# Patient Record
Sex: FEMALE | Race: WHITE | Employment: FULL TIME | URBAN - NONMETROPOLITAN AREA
[De-identification: names, ages, dates, MRNs, and addresses within clinical notes are randomized per-mention and may not be internally consistent; named-entity substitution may affect disease eponyms.]

---

## 2021-06-02 ENCOUNTER — HOSPITAL ENCOUNTER (OUTPATIENT)
Dept: PHYSICAL THERAPY | Age: 56
Setting detail: THERAPIES SERIES
Discharge: HOME | End: 2021-06-02
Payer: COMMERCIAL

## 2021-06-02 PROCEDURE — 97162 PT EVAL MOD COMPLEX 30 MIN: CPT

## 2021-06-02 NOTE — PROGRESS NOTES
swiss ball core, body mechanics. History of Present Illness: Back flare up September 2020. Radiculopathy RLE with constant numbness right middle 3 toes and lateral right leg. Weakness left ankle, extensor hallucis, hip and core musculature. Previous PT at [facility] in [facility]. SUBJECTIVE: States started doing yardwork and lifting heavy stones. Also carrying heavy sandbag around the yard. Pain was not right away. But, 1-2 weeks later twisted back tying shoe and hurt her back. This injury occurred early to mid September 2020. Noted pain at right lateral lumbar region and buttock. Was having increased pain and so had to stand and use standing desk at work. Pain became intense and noted with all activities such as standing, sitting and even laying down. She was also having difficulty driving. Patient went to [facility] PT in [facility] and did some exercises but pain stayed on and continued with HEP. Mid March 2021 was continuing to have numbness and weakness at RLE. MRI completed in April 2021. and then went for consult with Dr. Kymberly Greenberg. Now at present, back is getting better. Noting numbness at outer calf region and middle toes of her right foot. Notes intermittent right buttock pain in morning. Also, sometimes feels mild right low back pain. Noting morning stiffness, prolonged sitting. Standing and walking doing fine. Notes numbness more with long walks. Does not take medication for her back. Patient goals for PT: Anything she can do to take away numbness in her right leg. Learn activities and things to protect her back from further injury. Stay active. Patient enjoys yardwork, walk daily 10, 000 steps per day. Social/Functional History and Current Status:  Medications and Allergies have been reviewed and are listed on Medical History Questionnaire. Tamara Moreno lives with spouse in a multiple floor home with 3 steps with railing.     Task Previous Current   ADLs  Independent Independent   IADL's Independent Independent   Ambulation Independent Independent   Transfers Independent Independent   Recreation Independent-gardening, walking,  Modified Independent modified gardening techniques, resumed walking program but having increased numbness RLE lower leg and toes. Community Integration Independent Independent   Driving Active  Active    Work [employer]. 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 Prolonged sitting, Using stand up desk when needed. Sitting for 1 hour at a time. Bulk of day sitting. Full-Time. resumed work activities without difficulty. Does get up every hour to change positions. Notes no increased numbness and tingling at RLE in sitting. OBJECTIVE:    Pain: 0/10 but constant numbness at right lower leg laterally and middle 3 toes. Aggravated with walking more. Occasional pain at low back on right. Palpation Mild tenderness at right lumbosacral and right mid gluteal region at piriformis region,    Observation Noted good general mobility with bed mobility, transfers, partial squat. Limited strength right ankle, hip and core musculature. Decreased curve reversal bilateral lumbar with forward flexion. Posture Note fairly good head and neck and thoracic alignment, Note tendency to stand with slight sway back posturing with decreased core engagement with standing position,        Range of Motion Note decreased reversal of lumbar spine region with forward flexion with fingertips from floor 8 inches, Note extension lumbar spine WNLS. Noted pulling at right buttock and hamstring region with forward flexion and irritation in this region with extension. LE ROM: right hip flexion 115 degrees, left 140 degrees, SLR right 50 degrees, left 75 degrees. Strength Decreased strength at right extensor hallucis of great toe.  Decreased strength at right ankle with single heel raises with ankle inversion noted weakness of evertors and plantarflexors. Patient able to complete full squat but decreased wt shift through RLE. MMT WNLS hip and knee with exception right extensor hallucis great toe and ankle. See above functional strength testing. Decreased core engagment noted with standing position, decreased awareness of optimal lumbar neutral postion. Sensation Numbness lateral foot and middle 3 toes. Bed Mobility Independent with bed mobility. Transfers Transfers independent with sit<>stand. Ambulation Ambulation with normal gait pattern. Balance Not assessed. Special Tests Negative SLR test and seated RLE knee extension. TREATMENT   Precautions:    Pain:     X in shaded column indicates activity completed today   Modalities Parameters/  Location  Notes                     Manual Therapy Time/Technique  Notes                     Exercise/Intervention   Notes                                                                                  Specific Interventions Next Treatment: SKTC, Diagonal KTC, right piriformis stretch, hamstring stretch,  pelvic tilt, dead bugs/marches, UE reciprocal/LE reciprocal, bridges. Core engagement/strengthening. Progress to seated/standing dynamic stabilization. Activity/Treatment Tolerance:  [x]  Patient tolerated treatment well  []  Patient limited by fatigue  []  Patient limited by pain   []  Patient limited by medical complications  []  Other:     Assessment: Patient referred to PT low back pain with radiculopathy decreased sensation right foot at 3 middle toes and left lateral lower leg. Noting decreased strength through RLE, decreased core engagement. Decreased balance through RLE. Patient has not resumed full activity level with outdoor gardening, walking program. Limited sitting tolerance > 1 hour with position changes. Decreased lumbar articulation in flexion. Will follow 2-3x per week to address deficits.     Body Structures/Functions/Activity Limitations: impaired activity tolerance, impaired balance, impaired ROM, impaired safety awareness, impaired sensation, impaired strength and pain  Prognosis: good    GOALS:  Patient Billy Mckeon she can do to take away numbness in her right leg. Learn activities and things to protect her back from further injury. Stay active. Patient enjoys yardwork, walk daily 10, 000 steps per day. :     Short Term Goals:  Time Frame: 4 weeks  1. Patient to report of pain levels 0-1/10 at right buttock,  with PT and activities of daily living, work. 2. Patient to demonstrate increased sitting tolerance > 1hour to unlimited sitting tolerance, standing and walking tolerance for 1 hour. 3. Patient to demonstrate increased lumbar articulation in flexion and LE flexibility with forward flexion fingertips from floor 8 inches to 2 inches. 4. LE flexibility increased from right SKTC 115 degrees to 130 degrees, SLR 50 degrees to 70 degrees with increased ease with flexibility /mobility. 5. Patient to demonstrate increased core engagement and strength through abdominal and trunk muscles with ability to have awareness of optimal lumbar neutral 15 reps with a series of exercises. 6. Patient to demonstrate increased strength right LE strength right ankle eversion, extensor hallucis and plantarflexors 4-/5 to 4/5. Ability to complete single heelraise RLE with ability to complete 5 resp with good ankle stability without turning into inversion/supination. Long Term Goals:  Time Frame: 8 weeks  1. Patient has knowledge of proper body mechanics to decrease stress at low back. 2. Oswestry from 2% to 0%  3. Patient independent in HEP with follow through with exercises for core strengthening, flexibility, RLE strengthening and body mechanics. Patient Education: Use of lumbar roll when sitting, driving. Body mechanics with limit of forward flexion of trunk, squatting/bending at knees to retrieve object from floor.     [x]  HEP/Education Completed: Plan of Care, Goals,    Medbridge Access Code:  []  No new Education completed  []  Reviewed Prior HEP      []  Patient verbalized and/or demonstrated understanding of education provided. []  Patient unable to verbalize and/or demonstrate understanding of education provided. Will continue education. [x]  Barriers to learning: none    PLAN:  Treatment Recommendations: Strengthening, Range of Motion, Balance Training, Functional Mobility Training, Transfer Training, Neuromuscular Re-education, Manual Therapy - Soft Tissue Mobilization, Pain Management, Home Exercise Program, Patient Education and Self-Care Education and Training    [x]  Plan of care initiated. Plan to see patient 2-3 times per week for 8 weeks to address the treatment planned outlined above.   []  Continue with current plan of care  []  Modify plan of care as follows:    []  Hold pending physician visit  []  Discharge    Time In 0710   Time Out 0750   Timed Code Minutes: 0 min   Total Treatment Time: 40 min       Electronically Signed by: Adriana Edge PT

## 2021-06-04 ENCOUNTER — HOSPITAL ENCOUNTER (OUTPATIENT)
Dept: PHYSICAL THERAPY | Age: 56
Setting detail: THERAPIES SERIES
Discharge: HOME | End: 2021-06-04
Payer: COMMERCIAL

## 2021-06-04 PROCEDURE — 97110 THERAPEUTIC EXERCISES: CPT

## 2021-06-04 NOTE — PROGRESS NOTES
[address] [facility]  PHYSICAL THERAPY  [] EVALUATION  [x] DAILY NOTE (LAND) [] DAILY NOTE (AQUATIC ) [] PROGRESS NOTE [] DISCHARGE NOTE    [x] OUTPATIENT REHABILITATION CENTER [facility]   [] [place] [address]    [] [facility]   [] Rosie Hutchisony    Date: 2021  Patient Name:  Caroline Sebastian  : 1965  MRN: [id number]  CSN: [id number]    Referring Practitioner Wesley Boyer DO   Diagnosis Radiculopathy, lumbar region [M54.16]  Other intervertebral disc displacement, lumbar region [M51.26]    Treatment Diagnosis Intermittent back and right buttock pain with RLE weakness/numbness. Date of Evaluation 21    Additional Pertinent History History of low back pain 2020      Functional Outcome Measure Used Oswestry    2%. Insurance: Primary: Payor: Minda Fraser /  /  / ,   Secondary:    Authorization Information: No precertification, Max of 30 visits per calendar year. PT alone. Aquatic therapy yes, modalities covered, iontophoresis not covered, telehealth covered. Visit # 2, 2/10 for progress note   Visits Allowed: 30   Recertification Date:    Physician Follow-Up: Follow up as needed. Physician Orders: PT evaluate and treat. Exercise, ROM, strengthening LEs, HEP. Spine stabilization training, lumbar spine training, modalities as indicated. 3x per week for 6 weeks, swiss ball core, body mechanics. History of Present Illness: Back flare up 2020. Radiculopathy RLE with constant numbness right middle 3 toes and lateral right leg. Weakness left ankle, extensor hallucis, hip and core musculature. Previous PT at [facility] PT in [facility]. SUBJECTIVE: Pt stated numbness in R toes (middle 3) and lateral calf is constant. Pt stated she feels she is just getting used to feeling. Pt doesn't describe sensation as painful.              TREATMENT   Precautions:    Pain: denies    X in shaded column indicates activity completed today Modalities Parameters/  Location  Notes                     Manual Therapy Time/Technique  Notes                     Exercise/Intervention   Notes   SKTC 15 sec 3x x    HS stretch with strap 15sec 3x x    Piriformis figure 4 stretch 15 sec  3x x Tightness on with R           Pelvic tilt 10x  x    Abdominal bracing in hooklying 10x 5sec x    Abdominal bracing with marches 10x  x    Abdominal bracing dead bugs 10x      Abdominal bracing hip adductor squeeze 10x 5sec x    Abdominal bracing hip abduction in hooklying 10x  x             Specific Interventions Next Treatment: SKTC, Diagonal KTC, right piriformis stretch, hamstring stretch,  pelvic tilt, dead bugs/marches, UE reciprocal/LE reciprocal, bridges. Core engagement/strengthening. Progress to seated/standing dynamic stabilization. Activity/Treatment Tolerance:  [x]  Patient tolerated treatment well  []  Patient limited by fatigue  []  Patient limited by pain   []  Patient limited by medical complications  []  Other:     Assessment: Pt tolerated session well. Focus on low back stretches and postural awareness with abdominal bracing. Pt leaving tomorrow for vacation in [facility] with long plane ride. Pt given HEP that she will be able to perform is sitting on plane with some stretches for low back that can be easily replicated in supine in hotel. Moderate tightness noted in R LE with figure 4 piriformis stretch. Body Structures/Functions/Activity Limitations: impaired activity tolerance, impaired balance, impaired ROM, impaired safety awareness, impaired sensation, impaired strength and pain  Prognosis: good    GOALS:  Patient Johnna Meeks she can do to take away numbness in her right leg. Learn activities and things to protect her back from further injury. Stay active. Patient enjoys yardwork, walk daily 10, 000 steps per day. :     Short Term Goals:  Time Frame: 4 weeks  1.  Patient to report of pain levels 0-1/10 at right buttock,  with PT and activities Transfer Training, Neuromuscular Re-education, Manual Therapy - Soft Tissue Mobilization, Pain Management, Home Exercise Program, Patient Education and Self-Care Education and Training    [x]  Plan of care initiated. Plan to see patient 2-3 times per week for 8 weeks to address the treatment planned outlined above.   []  Continue with current plan of care  []  Modify plan of care as follows:    []  Hold pending physician visit  []  Discharge    Time In 1600   Time Out 1630   Timed Code Minutes: 30 min   Total Treatment Time: 30 min       Electronically Signed by: Tree Esteves PTA

## 2021-06-14 ENCOUNTER — HOSPITAL ENCOUNTER (OUTPATIENT)
Dept: PHYSICAL THERAPY | Age: 56
Setting detail: THERAPIES SERIES
Discharge: HOME | End: 2021-06-14
Payer: COMMERCIAL

## 2021-06-14 PROCEDURE — 97110 THERAPEUTIC EXERCISES: CPT

## 2021-06-14 NOTE — PROGRESS NOTES
in shaded column indicates activity completed today   Modalities Parameters/  Location  Notes                     Manual Therapy Time/Technique  Notes                     Exercise/Intervention   Notes   SKTC 15 sec 3x x    HS stretch and nerve glide RLE in 90/90 position with towel 15sec 3x x    Piriformis figure 4 stretch RLE  15 sec  3x x Tightness on with R           Pelvic tilt 15x  x    Finding optimal lumbar neutral  10x 5sec     Abdominal bracing with marches 10x  x    Abdominal bracing dead bugs 10x      Abdominal bracing hip adductor squeeze with ball between knees 15x 5sec x    Abdominal bracing hip abduction in hooklying 15x  x           Abdominal bracing with femur arcs 10x  x           pronelying following flexion and hooklying exercises  5 min  x    Prone press up  5x  x 0/10 pain following. Quadruped: pelvic tilt x10  x    UE reciprocal in quadruped. x10  x           pronelying and then prone press up end of session 5-7x  x                    Specific Interventions Next Treatment: SKTC, Diagonal KTC, right piriformis stretch, hamstring stretch,  pelvic tilt, dead bugs/marches, UE reciprocal/LE reciprocal, bridges. Core engagement/strengthening. Progress to seated/standing dynamic stabilization. Activity/Treatment Tolerance:  [x]  Patient tolerated treatment well  []  Patient limited by fatigue  []  Patient limited by pain   []  Patient limited by medical complications  []  Other:     Assessment: Progressed ex to quadruped and hooklying femur arcs. Also patient completed prone press ups between flexion based exercises due to noting right lateral lumbar pain and slight increased lower leg symptoms following hooklying flexion exercises. Following prone press ups 0/10 back pain and noted less diminished sensation at right lateral leg and middle 3 toes. End of session 0/10 pain level.        Body Structures/Functions/Activity Limitations: impaired activity tolerance, impaired balance, impaired ROM, impaired safety awareness, impaired sensation, impaired strength and pain  Prognosis: good    GOALS:  Patient Boo Márquezer she can do to take away numbness in her right leg. Learn activities and things to protect her back from further injury. Stay active. Patient enjoys yardwork, walk daily 10, 000 steps per day. :     Short Term Goals:  Time Frame: 4 weeks  1. Patient to report of pain levels 0-1/10 at right buttock,  with PT and activities of daily living, work. 2. Patient to demonstrate increased sitting tolerance > 1hour to unlimited sitting tolerance, standing and walking tolerance for 1 hour. 3. Patient to demonstrate increased lumbar articulation in flexion and LE flexibility with forward flexion fingertips from floor 8 inches to 2 inches. 4. LE flexibility increased from right SKTC 115 degrees to 130 degrees, SLR 50 degrees to 70 degrees with increased ease with flexibility /mobility. 5. Patient to demonstrate increased core engagement and strength through abdominal and trunk muscles with ability to have awareness of optimal lumbar neutral 15 reps with a series of exercises. 6. Patient to demonstrate increased strength right LE strength right ankle eversion, extensor hallucis and plantarflexors 4-/5 to 4/5. Ability to complete single heelraise RLE with ability to complete 5 resp with good ankle stability without turning into inversion/supination. Long Term Goals:  Time Frame: 8 weeks  1. Patient has knowledge of proper body mechanics to decrease stress at low back. 2. Oswestry from 2% to 0%  3. Patient independent in HEP with follow through with exercises for core strengthening, flexibility, RLE strengthening and body mechanics. Patient Education: Use of lumbar roll when sitting, driving. Body mechanics with limit of forward flexion of trunk, squatting/bending at knees to retrieve object from floor. [x]  HEP/Education Completed: Continue HEP.  Be aware of optimal lumbar neutral. Add prone press ups following flexion ex and complete at end of ex session. Monitor symptoms. [address]  []  No new Education completed  []  Reviewed Prior HEP      [x]  Patient verbalized and/or demonstrated understanding of education provided. []  Patient unable to verbalize and/or demonstrate understanding of education provided. Will continue education. [x]  Barriers to learning: none    PLAN:  Treatment Recommendations: Strengthening, Range of Motion, Balance Training, Functional Mobility Training, Transfer Training, Neuromuscular Re-education, Manual Therapy - Soft Tissue Mobilization, Pain Management, Home Exercise Program, Patient Education and Self-Care Education and Training    []  Plan of care initiated. Plan to see patient 2-3 times per week for 8 weeks to address the treatment planned outlined above.   [x]  Continue with current plan of care  []  Modify plan of care as follows:    []  Hold pending physician visit  []  Discharge    Time In 1650   Time Out 1736   Timed Code Minutes: 46   Total Treatment Time: 46       Electronically Signed by: Joanna Quiles, PT

## 2021-06-16 ENCOUNTER — HOSPITAL ENCOUNTER (OUTPATIENT)
Dept: PHYSICAL THERAPY | Age: 56
Setting detail: THERAPIES SERIES
Discharge: HOME | End: 2021-06-16
Payer: COMMERCIAL

## 2021-06-16 PROCEDURE — 97110 THERAPEUTIC EXERCISES: CPT

## 2021-06-16 NOTE — PROGRESS NOTES
[address] [facility]  PHYSICAL THERAPY  [] EVALUATION  [x] DAILY NOTE (LAND) [] DAILY NOTE (AQUATIC ) [] PROGRESS NOTE [] DISCHARGE NOTE    [x] OUTPATIENT REHABILITATION CENTER [facility]   [] [place] [address]    [] [facility]   [] Tayla Figueroa    Date: 2021  Patient Name:  Ele Sheehan  : 1965  MRN: [id number]  CSN: [id number]    Referring Practitioner Wesley Kline DO   Diagnosis Radiculopathy, lumbar region [M54.16]  Other intervertebral disc displacement, lumbar region [M51.26]    Treatment Diagnosis Intermittent back and right buttock pain with RLE weakness/numbness. Date of Evaluation 21    Additional Pertinent History History of low back pain 2020      Functional Outcome Measure Used Oswestry    2%. Insurance: Primary: Payor: Merline Palin /  /  / ,   Secondary:    Authorization Information: No precertification, Max of 30 visits per calendar year. PT alone. Aquatic therapy yes, modalities covered, iontophoresis not covered, telehealth covered. Visit # 3, 3/10 for progress note   Visits Allowed: 30   Recertification Date:    Physician Follow-Up: Follow up as needed. Physician Orders: PT evaluate and treat. Exercise, ROM, strengthening LEs, HEP. Spine stabilization training, lumbar spine training, modalities as indicated. 3x per week for 6 weeks, swiss ball core, body mechanics. History of Present Illness: Back flare up 2020. Radiculopathy RLE with constant numbness right middle 3 toes and lateral right leg. Weakness left ankle, extensor hallucis, hip and core musculature. Previous PT at [place] [address]. PT in [facility] [address]. SUBJECTIVE: Patient feels she is getting better. Feels her sensation is improving at right lower leg. Noted tolerated last session well. Left session with 0/10 symptoms and 0/10 pain. Exercises tolerated well.   Noting less than 1/10 at right lateral lumbar and slight symptoms at middle left 3 toes middle 3 toes and lateral leg RLE. Noting increased awareness of optimal lumbar neutral with exercises. Noting improved abdominal engagement with the exercises. Added resistance band with clamshells in supinelying. Also continued with extension in standing and pronelying between exercises to avoid irritation to disc/nerve radiculpathy symptoms. Body Structures/Functions/Activity Limitations: impaired activity tolerance, impaired balance, impaired ROM, impaired safety awareness, impaired sensation, impaired strength and pain  Prognosis: good    GOALS:  Patient Philly Sprinkles she can do to take away numbness in her right leg. Learn activities and things to protect her back from further injury. Stay active. Patient enjoys yardwork, walk daily 10, 000 steps per day. :     Short Term Goals:  Time Frame: 4 weeks  1. Patient to report of pain levels 0-1/10 at right buttock,  with PT and activities of daily living, work. 2. Patient to demonstrate increased sitting tolerance > 1hour to unlimited sitting tolerance, standing and walking tolerance for 1 hour. 3. Patient to demonstrate increased lumbar articulation in flexion and LE flexibility with forward flexion fingertips from floor 8 inches to 2 inches. 4. LE flexibility increased from right SKTC 115 degrees to 130 degrees, SLR 50 degrees to 70 degrees with increased ease with flexibility /mobility. 5. Patient to demonstrate increased core engagement and strength through abdominal and trunk muscles with ability to have awareness of optimal lumbar neutral 15 reps with a series of exercises. 6. Patient to demonstrate increased strength right LE strength right ankle eversion, extensor hallucis and plantarflexors 4-/5 to 4/5. Ability to complete single heelraise RLE with ability to complete 5 resp with good ankle stability without turning into inversion/supination. Long Term Goals:  Time Frame: 8 weeks  1.  Patient has knowledge of proper body mechanics to

## 2021-06-21 ENCOUNTER — HOSPITAL ENCOUNTER (OUTPATIENT)
Dept: PHYSICAL THERAPY | Age: 56
Setting detail: THERAPIES SERIES
Discharge: HOME | End: 2021-06-21
Payer: COMMERCIAL

## 2021-06-21 PROCEDURE — 97110 THERAPEUTIC EXERCISES: CPT

## 2021-06-21 NOTE — PROGRESS NOTES
[address] [facility]  PHYSICAL THERAPY  [] EVALUATION  [x] DAILY NOTE (LAND) [] DAILY NOTE (AQUATIC ) [] PROGRESS NOTE [] DISCHARGE NOTE    [x] OUTPATIENT REHABILITATION CENTER [facility]   [] [place] [address]    [] [facility]   [] Marcie Matthew    Date: 2021  Patient Name:  Eloy Quarles  : 1965  MRN: [id number]  CSN: [id number]    Referring Practitioner Wesley Frost DO   Diagnosis Radiculopathy, lumbar region [M54.16]  Other intervertebral disc displacement, lumbar region [M51.26]    Treatment Diagnosis Intermittent back and right buttock pain with RLE weakness/numbness. Date of Evaluation 21    Additional Pertinent History History of low back pain 2020      Functional Outcome Measure Used Oswestry    2%. Insurance: Primary: Payor: Jacki Jim /  /  / ,   Secondary:    Authorization Information: No precertification, Max of 30 visits per calendar year. PT alone. Aquatic therapy yes, modalities covered, iontophoresis not covered, telehealth covered. Visit # 3, 3/10 for progress note   Visits Allowed: 30   Recertification Date:    Physician Follow-Up: Follow up as needed. Physician Orders: PT evaluate and treat. Exercise, ROM, strengthening LEs, HEP. Spine stabilization training, lumbar spine training, modalities as indicated. 3x per week for 6 weeks, swiss ball core, body mechanics. History of Present Illness: Back flare up 2020. Radiculopathy RLE with constant numbness right middle 3 toes and lateral right leg. Weakness left ankle, extensor hallucis, hip and core musculature. Previous PT at [place] [address]. PT in [facility]. SUBJECTIVE: Patient feels she is same to slightly better compared to when she started PT. Notes when she watches her posture or back position and straightens up this helps her back and symptoms as well. Note right outer leg feeling better with improvement. Right middle 3 toes feel the same. TREATMENT   Precautions: History of lumbar disc derangement, lateral shift. Pain: <1/10. Back, right lateral leg is better, middle 3 toes remain the same. X in shaded column indicates activity completed today   Modalities Parameters/  Location  Notes                     Manual Therapy Time/Technique  Notes                     Exercise/Intervention   Notes   SKTC 15 sec 3x     HS stretch and nerve glide RLE in 90/90 position with towel 15sec 3x     Piriformis figure 4 stretch RLE  15 sec  3x  Tightness on with R           Pelvic tilt, pelvic clocks CCW/CW  10x, 10x  x    Bridges  With articulation 10x  x                                       Abdominal bracing with marches 15x  x    Abdominal bracing dead bugs 10x      Abdominal bracing hip adductor squeeze with ball between knees 15x 5sec     Abdominal bracing hip abduction in hooklying resistance band 15x Green band     Abdominal bracing with UE reciprocal arm arcs while legs in table top position  10x  x    Abdominal bracing with femur arcs 15x  x           pronelying following flexion and hooklying exercises  5 min      Prone press up  5x  x    Quadruped: pelvic tilt x10  x    UE reciprocal in quadruped. x10  x    LE reciprocal in quadruped x5  x    pronelying and then prone press  5-7x  x                                       Standing: back extension -beginning of session, middle of session and end of session 2x5  x                    Specific Interventions Next Treatment: SKTC, Diagonal KTC, right piriformis stretch, hamstring stretch,  pelvic tilt, dead bugs/marches, UE reciprocal/LE reciprocal, bridges. Core engagement/strengthening. Progress to seated/standing dynamic stabilization.      Activity/Treatment Tolerance:  [x]  Patient tolerated treatment well  []  Patient limited by fatigue  []  Patient limited by pain   []  Patient limited by medical complications  []  Other:     Assessment: Progressed to more articulation at lumbar spine with at low back. 2. Oswestry from 2% to 0%  3. Patient independent in HEP with follow through with exercises for core strengthening, flexibility, RLE strengthening and body mechanics. Patient Education: Use of lumbar roll when sitting, driving. Body mechanics with limit of forward flexion of trunk, squatting/bending at knees to retrieve object from floor.  [x]  HEP/Education Completed: Continue HEP. Be aware of optimal lumbar neutral. Revised HEp with additional ex completed. [id number] Niesha Dumont  []  No new Education completed  []  Reviewed Prior HEP      [x]  Patient verbalized and/or demonstrated understanding of education provided. []  Patient unable to verbalize and/or demonstrate understanding of education provided. Will continue education. [x]  Barriers to learning: none    PLAN:  Treatment Recommendations: Strengthening, Range of Motion, Balance Training, Functional Mobility Training, Transfer Training, Neuromuscular Re-education, Manual Therapy - Soft Tissue Mobilization, Pain Management, Home Exercise Program, Patient Education and Self-Care Education and Training    []  Plan of care initiated. Plan to see patient 2-3 times per week for 8 weeks to address the treatment planned outlined above.   [x]  Continue with current plan of care  []  Modify plan of care as follows:    []  Hold pending physician visit  []  Discharge    Time In [phone]   Time Out 0855   Timed Code Minutes: 38   Total Treatment Time: 38       Electronically Signed by: Owen Hummel PT

## 2021-06-25 ENCOUNTER — HOSPITAL ENCOUNTER (OUTPATIENT)
Dept: PHYSICAL THERAPY | Age: 56
Setting detail: THERAPIES SERIES
Discharge: HOME | End: 2021-06-25
Payer: COMMERCIAL

## 2021-06-25 PROCEDURE — 97110 THERAPEUTIC EXERCISES: CPT

## 2021-06-25 NOTE — PROGRESS NOTES
[address] [facility]  PHYSICAL THERAPY  [] EVALUATION  [x] DAILY NOTE (LAND) [] DAILY NOTE (AQUATIC ) [] PROGRESS NOTE [] DISCHARGE NOTE    [x] OUTPATIENT REHABILITATION CENTER [facility]   [] [place] [address]    [] [facility]   [] Jewel Phipps    Date: 2021  Patient Name:  cUhe Cole  : 1965  MRN: [id number]  CSN: [id number]    Referring Practitioner Wesley Carter DO   Diagnosis Radiculopathy, lumbar region [M54.16]  Other intervertebral disc displacement, lumbar region [M51.26]    Treatment Diagnosis Intermittent back and right buttock pain with RLE weakness/numbness. Date of Evaluation 21    Additional Pertinent History History of low back pain 2020      Functional Outcome Measure Used Oswestry    2%. Insurance: Primary: Payor: Erica Griffiths 150 /  /  / ,   Secondary:    Authorization Information: No precertification, Max of 30 visits per calendar year. PT alone. Aquatic therapy yes, modalities covered, iontophoresis not covered, telehealth covered. Visit # 4, 4/10 for progress note   Visits Allowed: 30   Recertification Date:    Physician Follow-Up: Follow up as needed. Physician Orders: PT evaluate and treat. Exercise, ROM, strengthening LEs, HEP. Spine stabilization training, lumbar spine training, modalities as indicated. 3x per week for 6 weeks, swiss ball core, body mechanics. History of Present Illness: Back flare up 2020. Radiculopathy RLE with constant numbness right middle 3 toes and lateral right leg. Weakness left ankle, extensor hallucis, hip and core musculature. Previous PT at [facility] PT in [facility]. SUBJECTIVE: Patient feels like she is improving. Less numbness noted at middle 3 toes on RLE. She has been doing her HEP and feels it is helpful. She did add her ankle strengthening ex and does continue to watch her posture/position of her spine.         TREATMENT   Precautions: History of lumbar disc derangement, lateral shift. Pain: <1/10. Back, right lateral leg is better, middle 3 toes remain the same. X in shaded column indicates activity completed today   Modalities Parameters/  Location  Notes                     Manual Therapy Time/Technique  Notes                     Exercise/Intervention   Notes   SKTC 15 sec 3x x    HS stretch and nerve glide RLE in 90/90 position with towel 15sec 3x     Piriformis figure 4 stretch RLE  15 sec  3x x Tightness on with R           Pelvic tilt, pelvic clocks CCW/CW  10x, 10x  x    Bridges  With articulation 10x  x                                       Abdominal bracing with marches 15x      Abdominal bracing dead bugs 10x      Abdominal bracing hip adductor squeeze with ball between knees 15x 5sec     Abdominal bracing hip abduction in hooklying resistance band 15x Green band     Abdominal bracing with UE reciprocal arm arcs while legs in table top position  10x      Abdominal bracing with femur arcs 15x             pronelying following flexion and hooklying exercises  5 min      Prone press up  5x  x    Quadruped: pelvic tilt x10  x    UE reciprocal in quadruped. x10  x    LE reciprocal in quadruped x5  x    UE/LE in quadruped 5x  x    Prone press ups  5-10x  x    bookopening exercise right/left sidelying 5x each  x                         Standing: back extension -beginning of session, middle of session and end of session 2x5  x           Stability Ball seated:        Pelvic tilts, pelvic clocks, side to side  10x  x    Ankle pf/df 10x  x    Marches  10x  x    LAQS  5x  x                                                Specific Interventions Next Treatment: SKTC, Diagonal KTC, right piriformis stretch, hamstring stretch,  pelvic tilt, dead bugs/marches, UE reciprocal/LE reciprocal, bridges. Core engagement/strengthening. Progress to seated/standing dynamic stabilization.      Activity/Treatment Tolerance:  [x]  Patient tolerated treatment well  []  Patient limited by fatigue  []  Patient limited by pain   []  Patient limited by medical complications  []  Other:     Assessment: Added more articulation to lumbar spine outside sagital pain with working on oblique directions rotation, side to side with pelvis, Added book opening ex for thoracic articulation. Ex tolerated well for core work with addition of stability ball in seated position. Patient reporting of improved sensation at middle 3 toes and increased strength with single heelraises at beginning of session. Body Structures/Functions/Activity Limitations: impaired activity tolerance, impaired balance, impaired ROM, impaired safety awareness, impaired sensation, impaired strength and pain  Prognosis: good    GOALS:  Patient Dell Imer she can do to take away numbness in her right leg. Learn activities and things to protect her back from further injury. Stay active. Patient enjoys yardwork, walk daily 10, 000 steps per day. :     Short Term Goals:  Time Frame: 4 weeks  1. Patient to report of pain levels 0-1/10 at right buttock,  with PT and activities of daily living, work. 2. Patient to demonstrate increased sitting tolerance > 1hour to unlimited sitting tolerance, standing and walking tolerance for 1 hour. 3. Patient to demonstrate increased lumbar articulation in flexion and LE flexibility with forward flexion fingertips from floor 8 inches to 2 inches. 4. LE flexibility increased from right SKTC 115 degrees to 130 degrees, SLR 50 degrees to 70 degrees with increased ease with flexibility /mobility. 5. Patient to demonstrate increased core engagement and strength through abdominal and trunk muscles with ability to have awareness of optimal lumbar neutral 15 reps with a series of exercises. 6. Patient to demonstrate increased strength right LE strength right ankle eversion, extensor hallucis and plantarflexors 4-/5 to 4/5.  Ability to complete single heelraise RLE with ability to complete 5 resp

## 2021-06-28 ENCOUNTER — HOSPITAL ENCOUNTER (OUTPATIENT)
Dept: PHYSICAL THERAPY | Age: 56
Setting detail: THERAPIES SERIES
Discharge: HOME | End: 2021-06-28
Payer: COMMERCIAL

## 2021-06-28 PROCEDURE — 97110 THERAPEUTIC EXERCISES: CPT

## 2021-06-28 NOTE — PROGRESS NOTES
[address] [facility]  PHYSICAL THERAPY  [] EVALUATION  [x] DAILY NOTE (LAND) [] DAILY NOTE (AQUATIC ) [] PROGRESS NOTE [] DISCHARGE NOTE    [x] OUTPATIENT REHABILITATION CENTER [facility]   [] [place] [address]    [] [facility]   [] Timothy Parker    Date: 2021  Patient Name:  J Luis Iglesias  : 1965  MRN: [id number]  CSN: [id number]    Referring Practitioner Wesley Jorge DO   Diagnosis Radiculopathy, lumbar region [M54.16]  Other intervertebral disc displacement, lumbar region [M51.26]    Treatment Diagnosis Intermittent back and right buttock pain with RLE weakness/numbness. Date of Evaluation 21    Additional Pertinent History History of low back pain 2020      Functional Outcome Measure Used Oswestry    2%. Insurance: Primary: Payor: Erica Griffiths 150 /  /  / ,   Secondary:    Authorization Information: No precertification, Max of 30 visits per calendar year. PT alone. Aquatic therapy yes, modalities covered, iontophoresis not covered, telehealth covered. Visit # 5, 5/10 for progress note   Visits Allowed: 30   Recertification Date:    Physician Follow-Up: Follow up as needed. Physician Orders: PT evaluate and treat. Exercise, ROM, strengthening LEs, HEP. Spine stabilization training, lumbar spine training, modalities as indicated. 3x per week for 6 weeks, swiss ball core, body mechanics. History of Present Illness: Back flare up 2020. Radiculopathy RLE with constant numbness right middle 3 toes and lateral right leg. Weakness left ankle, extensor hallucis, hip and core musculature. Previous PT at [facility] PT in [facility]. SUBJECTIVE: Patient feels like she is getting better. Mainly stiffness noted at low back more than pain. Right mid buttock region feels less stiff as well. Continues to have some symptoms in middle 3 toes . Sensation is better in toes. Noting improved strength with right single heelraises. Tolerating ex well. TREATMENT   Precautions: History of lumbar disc derangement, lateral shift. Pain: <1/10. Back, right lateral leg is better, middle 3 toes remain the same. X in shaded column indicates activity completed today   Modalities Parameters/  Location  Notes                     Manual Therapy Time/Technique  Notes                     Exercise/Intervention   Notes   SKTC 15 sec 3x x    HS stretch and nerve glide RLE in 90/90 position with towel 15sec 3x     Piriformis figure 4 stretch RLE  15 sec  3x x            Pelvic tilt, pelvic clocks CCW/CW  10x, 10x  x    Bridges  With articulation 5x  x                                       Abdominal bracing with marches 15x      Abdominal bracing dead bugs 10x      Abdominal bracing hip adductor squeeze with ball between knees 15x 5sec     Abdominal bracing hip abduction in hooklying resistance band 15x Green band     Abdominal bracing with UE reciprocal arm arcs while legs in table top position  10x      Abdominal bracing with femur arcs 15x             pronelying following flexion and hooklying exercises  5 min      Prone press up  5x  x    Quadruped: pelvic tilt x15  x    UE reciprocal in quadruped.   x15  x    LE reciprocal in quadruped x10  x    UE/LE in quadruped 10x  x    Prone press ups  10x  x    bookopening exercise right/left sidelying 5x each  x                         Standing: back extension -beginning of session, middle of session and end of session 2x5  x           Stability Ball seated:        Pelvic tilts, pelvic clocks, side to side  10x  x    Ankle pf/df 10x      Marches  10x  x    LAQS  5x      UE: shoulder flexion, shoulder abduction  x10  x    UE:resistance band: rows, ER bilaterally, shoulder extension x10 peach x    UE chest expansion shoulder extension with neck rotation x4 peach x                           Specific Interventions Next Treatment: SKTC, Diagonal KTC, right piriformis stretch, hamstring stretch,  pelvic tilt,

## 2021-06-30 ENCOUNTER — HOSPITAL ENCOUNTER (OUTPATIENT)
Dept: PHYSICAL THERAPY | Age: 56
Setting detail: THERAPIES SERIES
Discharge: HOME | End: 2021-06-30
Payer: COMMERCIAL

## 2021-06-30 PROCEDURE — 97164 PT RE-EVAL EST PLAN CARE: CPT

## 2021-06-30 PROCEDURE — 97110 THERAPEUTIC EXERCISES: CPT

## 2021-06-30 NOTE — PROGRESS NOTES
[address] [facility]  PHYSICAL THERAPY  [] EVALUATION  [] DAILY NOTE (LAND) [] DAILY NOTE (AQUATIC ) [x] PROGRESS NOTE [] DISCHARGE NOTE    [x] OUTPATIENT REHABILITATION CENTER [facility] [place]   [] [place] [address]    [] [facility]   [] [facility]    Date: 2021  Patient Name:  Renato Brand  : 1965  MRN: [id number]  CSN: [id number]    Referring Practitioner Wesley Diggs DO   Diagnosis Radiculopathy, lumbar region [M54.16]  Other intervertebral disc displacement, lumbar region [M51.26]    Treatment Diagnosis Intermittent back and right buttock pain with RLE weakness/numbness. Date of Evaluation 21    Additional Pertinent History History of low back pain 2020      Functional Outcome Measure Used Oswestry    0%. Insurance: Primary: Payor: Robinson Goldberg /  /  / ,   Secondary:    Authorization Information: No precertification, Max of 30 visits per calendar year. PT alone. Aquatic therapy yes, modalities covered, iontophoresis not covered, telehealth covered. Visit # 6, 0/6 for progress note   Visits Allowed: 30   Recertification Date:    Physician Follow-Up: Follow up as needed. Physician Orders: PT evaluate and treat. Exercise, ROM, strengthening LEs, HEP. Spine stabilization training, lumbar spine training, modalities as indicated. 3x per week for 6 weeks, swiss ball core, body mechanics. History of Present Illness: Back flare up 2020. Radiculopathy RLE with constant numbness right middle 3 toes and lateral right leg. Weakness left ankle, extensor hallucis, hip and core musculature. Previous PT at [place] [address]. PT in [facility]. SUBJECTIVE: Since being in PT noting less pain at buttock/hip region, improved sensation with less numbness in toes and improved flexibility.  Patient is now able to overall put on socks and shoes with more ease and notice she has had progress and optimistic that she can be back to normal. TREATMENT   Precautions: History of lumbar disc derangement, lateral shift. Pain: 0/10. Back, right lateral leg is better, middle 3 toes difficult to notice and not as pronounced      X in shaded column indicates activity completed today   Modalities Parameters/  Location  Notes               Reassessment  x Refer to goal summary for status   Manual Therapy Time/Technique  Notes                     Exercise/Intervention   Notes   SKTC/DKTC  15 sec 3x x DKTC today   HS stretch and nerve glide RLE in 90/90 position with towel 15sec 3x     Piriformis figure 4 stretch RLE  15 sec  3x x            Pelvic tilt, pelvic clocks CCW/CW  10x, 10x  x    Bridges  With articulation 5x  x    Bridges with articulation with rotation on each side. 3x each side  x                                Abdominal bracing with marches 15x      Abdominal bracing dead bugs 10x      Abdominal bracing hip adductor squeeze with ball between knees 15x 5sec     Abdominal bracing hip abduction in hooklying resistance band 15x Green band     Abdominal bracing with UE reciprocal arm arcs while legs in table top position  10x      Abdominal bracing with femur arcs 15x             pronelying following flexion and hooklying exercises  5 min      Prone press up  5x-10x   x    Quadruped: pelvic tilt x15      UE reciprocal in quadruped.   x15      LE reciprocal in quadruped x10      UE/LE in quadruped 10x      Prone press ups  10x      bookopening exercise right/left sidelying 5x each                           Standing: back extension -beginning of session, middle of session and end of session 2x5  x           Stability Ball seated:        Pelvic tilts, pelvic clocks, side to side  10x      Ankle pf/df 10x      Marches  10x      LAQS  5x      UE: shoulder flexion, shoulder abduction  x10      UE:resistance band: rows, ER bilaterally, shoulder extension x10 peach     UE chest expansion shoulder extension with neck rotation x4 peach Specific Interventions Next Treatment: SKTC, Diagonal KTC, right piriformis stretch, hamstring stretch,  pelvic tilt, dead bugs/marches, UE reciprocal/LE reciprocal, bridges. Core engagement/strengthening. Progress to seated/standing dynamic stabilization. Activity/Treatment Tolerance:  [x]  Patient tolerated treatment well  []  Patient limited by fatigue  []  Patient limited by pain   []  Patient limited by medical complications  []  Other:     Assessment: Progressing well in PT. Reassessment today with goals being met. Noted continued restriction with forward flexion by 5 inches, Improved 3 inches. Core work and optimal neutral awareness improving. Noting articulation with spine improving with less stiffness in rotation and noting improved sensation at middle 3 toes and right lateral leg region. Patient to continue PT 1x per week for next 3 weeks to address RLE strength, progression in core work, dynamic stabilization ex and HEP. Body Structures/Functions/Activity Limitations: impaired activity tolerance, impaired balance, impaired ROM, impaired safety awareness, impaired sensation, impaired strength and pain  Prognosis: good    GOALS:  Patient Sylvester Crisostomo she can do to take away numbness in her right leg. Learn activities and things to protect her back from further injury. Stay active. Patient enjoys yardwork, walk daily 10, 000 steps per day. :     Short Term Goals:  Time Frame: 4 weeks  1. Patient to report of pain levels 0-1/10 at right buttock,  with PT and activities of daily living, work. GOAL MET Patient having no buttock pain or back pain. 0/10 occasional stiffness at back but is able to complete activities at home with more ease and able to place shoes and socks on with less stiffness/pain. 2. Patient to demonstrate increased sitting tolerance > 1hour to unlimited sitting tolerance, standing and walking tolerance for 1 hour.   GOAL MET Patient is able to tolerate as much walking now completed  []  Reviewed Prior HEP      [x]  Patient verbalized and/or demonstrated understanding of education provided. []  Patient unable to verbalize and/or demonstrate understanding of education provided. Will continue education. [x]  Barriers to learning: none    PLAN:  Treatment Recommendations: Strengthening, Range of Motion, Balance Training, Functional Mobility Training, Transfer Training, Neuromuscular Re-education, Manual Therapy - Soft Tissue Mobilization, Pain Management, Home Exercise Program, Patient Education and Self-Care Education and Training    []  Plan of care initiated. Plan to see patient 2-3 times per week for 8 weeks to address the treatment planned outlined above.   [x]  Continue with current plan of care  []  Modify plan of care as follows:    []  Hold pending physician visit  []  Discharge    Time In 0716   Time Out 0750   Timed Code Minutes: 34   Total Treatment Time: 44       Electronically Signed by: Zoran Terrazas PT

## 2021-07-12 ENCOUNTER — HOSPITAL ENCOUNTER (OUTPATIENT)
Dept: PHYSICAL THERAPY | Age: 56
Setting detail: THERAPIES SERIES
Discharge: HOME | End: 2021-07-12
Payer: COMMERCIAL

## 2021-07-12 PROCEDURE — 97110 THERAPEUTIC EXERCISES: CPT

## 2021-07-12 NOTE — PROGRESS NOTES
[address] [facility]  PHYSICAL THERAPY  [] EVALUATION  [x] DAILY NOTE (LAND) [] DAILY NOTE (AQUATIC ) [] PROGRESS NOTE [] DISCHARGE NOTE    [x] OUTPATIENT REHABILITATION CENTER [facility]   [] [place] [address]    [] [facility]   [place] Claudia Lights    Date: 2021  Patient Name:  Rome Haider  : 1965  MRN: [id number]  CSN: [id number]    Referring Practitioner Wesley Buckley DO   Diagnosis Radiculopathy, lumbar region [M54.16]  Other intervertebral disc displacement, lumbar region [M51.26]    Treatment Diagnosis Intermittent back and right buttock pain with RLE weakness/numbness. Date of Evaluation 21    Additional Pertinent History History of low back pain 2020      Functional Outcome Measure Used Oswestry    0%. Insurance: Primary: Payor: Mehran Moon /  /  / ,   Secondary:    Authorization Information: No precertification, Max of 30 visits per calendar year. PT alone. Aquatic therapy yes, modalities covered, iontophoresis not covered, telehealth covered. Visit # 7, for progress note   Visits Allowed: 30   Recertification Date: 1164   Physician Follow-Up: Follow up as needed. Physician Orders: PT evaluate and treat. Exercise, ROM, strengthening LEs, HEP. Spine stabilization training, lumbar spine training, modalities as indicated. 3x per week for 6 weeks, swiss ball core, body mechanics. History of Present Illness: Back flare up 2020. Radiculopathy RLE with constant numbness right middle 3 toes and lateral right leg. Weakness left ankle, extensor hallucis, hip and core musculature. Previous PT at [place] [address]. PT in [facility]. SUBJECTIVE: Patient reports she feels she is getting better. Some morning stiffness noted when she does pelvic tilts. But does not notice the stiffness as the day goes on. She did go for a 10 mile bike ride this weekend and tolerated bike ride fine.  She did switch to a more upright bike so she is not leaning forward. TREATMENT   Precautions: History of lumbar disc derangement, lateral shift. Pain: 0/10. Back, right lateral leg is better, middle 3 toes difficult to notice and not as pronounced      X in shaded column indicates activity completed today   Modalities Parameters/  Location  Notes               Reassessment  x Refer to goal summary for status   Manual Therapy Time/Technique  Notes                     Exercise/Intervention   Notes   SKTC/DKTC  15 sec 3x x DKTC today   HS stretch and nerve glide RLE in 90/90 position with towel 15sec 3x     Piriformis figure 4 stretch RLE  15 sec  3x x            Pelvic tilt, pelvic clocks CCW/CW  10x, 10x  x    Bridges  With articulation 10x  x    Bridges with articulation with rotation on each side. 3x each side  x                                Abdominal bracing with marches with foam roller  10x  x    Abdominal bracing UE reciprocal while on foam roller  10x  x    Abdominal bracing hip adductor squeeze with ball between knees on foam roller  15x 5sec x    Abdominal bracing hip abduction in hooklying resistance band on foam roller 15x Green band x    Abdominal bracing with UE reciprocal arm arcs while legs in table top position  10x      Abdominal bracing with femur arcs on foam roller  10x  x           pronelying following flexion and hooklying exercises  5 min      Prone press up  5x-10x   x    Quadruped: pelvic tilt x15  x    UE reciprocal in quadruped.   x15      LE reciprocal in quadruped x10      UE/LE in quadruped 10x      Prone press ups  10x      bookopening exercise right/left sidelying 5x each                           Standing: back extension -beginning of session, middle of session and end of session 2x5  x           Stability Ball seated:        Pelvic tilts, pelvic clocks, side to side  10x      Ankle pf/df 10x      Marches  10x      LAQS  5x      UE: shoulder flexion, shoulder abduction  x10      UE:resistance band: rows, ER bilaterally, shoulder extension x10 peach     UE chest expansion shoulder extension with neck rotation x4 peach                   Standing: back against wall  Partial knee bends  x10  x    Lunges with RLE, then LLE  x5   x    Bilateral heelraises, single heelraises. X10, x5  x    3 way hip with LEs r/l x10  x           hydrostick  Hip width stance fwd/back , diagonals right/left  10x   x    gray pose and diagonal/oblique stretch to right/left  3x  x             Specific Interventions Next Treatment: SKTC, Diagonal KTC, right piriformis stretch, hamstring stretch,  pelvic tilt, dead bugs/marches, UE reciprocal/LE reciprocal, bridges. Core engagement/strengthening. Progress to seated/standing dynamic stabilization. Activity/Treatment Tolerance:  [x]  Patient tolerated treatment well  []  Patient limited by fatigue  []  Patient limited by pain   []  Patient limited by medical complications  []  Other:     Assessment: . Progressed dynamic stabilization and ROM for lumbar spine with addition of foam roller to hooklying exercises. Added 3 way hip, functional ex with bilateral and single heelraises, partial squats with back against the wall, lunges reciprocally keeping good spine alignment. Patient progressing well with exercises. Noting feeling better at end of session with improved sensation at toes and more flexible at her back. Body Structures/Functions/Activity Limitations: impaired activity tolerance, impaired balance, impaired ROM, impaired safety awareness, impaired sensation, impaired strength and pain  Prognosis: good    GOALS:  Patient Kina Worrell she can do to take away numbness in her right leg. Learn activities and things to protect her back from further injury. Stay active. Patient enjoys yardwork, walk daily 10, 000 steps per day. :     Short Term Goals:  Time Frame: 4 weeks  1. Patient to report of pain levels 0-1/10 at right buttock,  with PT and activities of daily living, work.    GOAL MET Patient having no buttock pain or back pain. 0/10 occasional stiffness at back but is able to complete activities at home with more ease and able to place shoes and socks on with less stiffness/pain. 2. Patient to demonstrate increased sitting tolerance > 1hour to unlimited sitting tolerance, standing and walking tolerance for 1 hour. GOAL MET Patient is able to tolerate as much walking now without limitation. Patient is able to it > 1 hour. 3. Patient to demonstrate increased lumbar articulation in flexion and LE flexibility with forward flexion fingertips from floor 8 inches to 2 inches. GOAL NOT MET forward flexion 5 inches fingertips from floor. 4. LE flexibility increased from right SKTC 115 degrees to 130 degrees, SLR 50 degrees to 70 degrees with increased ease with flexibility /mobility. GOAL MET SKTC right 130 degrees, SLR 75-80 degrees Right. 5. Patient to demonstrate increased core engagement and strength through abdominal and trunk muscles with ability to have awareness of optimal lumbar neutral 15 reps with a series of exercises. GOAL MET Patient has been able to complete exercises in optimal neutral in supine, quadruped, seated on stability ball. 6. Patient to demonstrate increased strength right LE strength right ankle eversion, extensor hallucis and plantarflexors 4-/5 to 4/5. Ability to complete single heelraise RLE with ability to complete 5 resp with good ankle stability without turning into inversion/supination. GOAL NOT MET ankle plantar flexors 4 to 4-/5 noted improved stability at ankle with heelraies. Noted extensor hallucis 4/5. Long Term Goals:  Time Frame: 8 weeks  1. Patient has knowledge of proper body mechanics to decrease stress at low back. GOAL MET proper body mechanics noted. 2. Oswestry from 2% to 0%  GOAL MET Oswestry 0%    3.  Patient independent in HEP with follow through with exercises for core strengthening, flexibility, RLE strengthening and body mechanics. Ongoing Need progression with further strengthening program for core and right ankle,        Patient Education: HEP as issued May add wall slide with partial squats, and lunges in HEP.  [x]  HEP/Education Completed: Continue HEP. Be aware of optimal lumbar neutral. Revised HEp with additional ex completed. HEP issued of seated stabilization with resistance band. [id number] Niesha Josephjosé miguel  []  No new Education completed  []  Reviewed Prior HEP      [x]  Patient verbalized and/or demonstrated understanding of education provided. []  Patient unable to verbalize and/or demonstrate understanding of education provided. Will continue education. [x]  Barriers to learning: none    PLAN:  Treatment Recommendations: Strengthening, Range of Motion, Balance Training, Functional Mobility Training, Transfer Training, Neuromuscular Re-education, Manual Therapy - Soft Tissue Mobilization, Pain Management, Home Exercise Program, Patient Education and Self-Care Education and Training    []  Plan of care initiated. Plan to see patient 2-3 times per week for 8 weeks to address the treatment planned outlined above.   [x]  Continue with current plan of care  []  Modify plan of care as follows:    []  Hold pending physician visit  []  Discharge    Time In 1647   Time Out 1732   Timed Code Minutes: 43   Total Treatment Time: 43       Electronically Signed by: Sims Mcardle, PT

## 2021-07-19 ENCOUNTER — HOSPITAL ENCOUNTER (OUTPATIENT)
Dept: PHYSICAL THERAPY | Age: 56
Setting detail: THERAPIES SERIES
Discharge: HOME | End: 2021-07-19
Payer: COMMERCIAL

## 2021-07-19 PROCEDURE — 97110 THERAPEUTIC EXERCISES: CPT

## 2021-07-19 NOTE — PROGRESS NOTES
[facility]  PHYSICAL THERAPY  [] EVALUATION  [x] DAILY NOTE (LAND) [] DAILY NOTE (AQUATIC ) [] PROGRESS NOTE [] DISCHARGE NOTE    [x] OUTPATIENT REHABILITATION CENTER [facility]   [] [place] [address]    [] [facility]   [] Yesika Manning    Date: 2021  Patient Name:  Sarah Wilder  : 1965  MRN: [id number]  CSN: [id number]    Referring Practitioner Wesley Alves DO   Diagnosis Radiculopathy, lumbar region [M54.16]  Other intervertebral disc displacement, lumbar region [M51.26]    Treatment Diagnosis Intermittent back and right buttock pain with RLE weakness/numbness. Date of Evaluation 21    Additional Pertinent History History of low back pain 2020      Functional Outcome Measure Used Oswestry    0%. Insurance: Primary: Payor: Paola Austin /  /  / ,   Secondary:    Authorization Information: No precertification, Max of 30 visits per calendar year. PT alone. Aquatic therapy yes, modalities covered, iontophoresis not covered, telehealth covered. Visit # 7, for progress note   Visits Allowed: 30   Recertification Date: 3/42/9723   Physician Follow-Up: Follow up as needed. Physician Orders: PT evaluate and treat. Exercise, ROM, strengthening LEs, HEP. Spine stabilization training, lumbar spine training, modalities as indicated. 3x per week for 6 weeks, swiss ball core, body mechanics. History of Present Illness: Back flare up 2020. Radiculopathy RLE with constant numbness right middle 3 toes and lateral right leg. Weakness left ankle, extensor hallucis, hip and core musculature. Previous PT at [place] [address]. PT in [facility]. SUBJECTIVE: Patient reports of some stiffness at her back on right side. Toes feel about the same. Did find a bike to ride this weekend and keeps upright posture with slight bending forward of her back. Going to adjust the handle bars so that she does not lean forward. Also going for a bike fitting. UE:resistance band: rows, ER bilaterally, shoulder extension x10 peach     UE chest expansion shoulder extension with neck rotation x4 peach                   Standing: back against wall  Partial knee bends  x15 5 count x    Lunges with RLE, then LLE  x10   x    Bilateral heelraises, single heelraises. X15, x10  x    3 way hip with LEs r/l x10  x           hydrostick  Hip width stance fwd/back , diagonals right/left  10x       gray pose and diagonal/oblique stretch to right/left  3x      Elliptical.  3 minutes   x      Specific Interventions Next Treatment: SKTC, Diagonal KTC, right piriformis stretch, hamstring stretch,  pelvic tilt, dead bugs/marches, UE reciprocal/LE reciprocal, bridges. Core engagement/strengthening. Progress to seated/standing dynamic stabilization. Activity/Treatment Tolerance:  [x]  Patient tolerated treatment well  []  Patient limited by fatigue  []  Patient limited by pain   []  Patient limited by medical complications  []  Other:     Assessment: . Continued with foam roller exercises in hooklying and also added swan thoracic and lumbar extension prone using foam roller. Ex tolerated well. Will be modifying pelvic tilts to avoid a full posterior pelvic tilt due to right LE tingling. No flexion today. Concentrated on optimal lumbar neutral and core/abdominal engagement with ex on foam roller and with standing ex lunges and partial squats. Patient was able to do Elliptical also today with awareness of lumbar position to decrease stress on this region. Body Structures/Functions/Activity Limitations: impaired activity tolerance, impaired balance, impaired ROM, impaired safety awareness, impaired sensation, impaired strength and pain  Prognosis: good    GOALS:  Patient Cornell Israelef she can do to take away numbness in her right leg. Learn activities and things to protect her back from further injury. Stay active.  Patient enjoys yardwork, walk daily 10, 000 steps per day. :     Short Term Goals:  Time Frame: 4 weeks  1. Patient to report of pain levels 0-1/10 at right buttock,  with PT and activities of daily living, work. GOAL MET Patient having no buttock pain or back pain. 0/10 occasional stiffness at back but is able to complete activities at home with more ease and able to place shoes and socks on with less stiffness/pain. 2. Patient to demonstrate increased sitting tolerance > 1hour to unlimited sitting tolerance, standing and walking tolerance for 1 hour. GOAL MET Patient is able to tolerate as much walking now without limitation. Patient is able to it > 1 hour. 3. Patient to demonstrate increased lumbar articulation in flexion and LE flexibility with forward flexion fingertips from floor 8 inches to 2 inches. GOAL NOT MET forward flexion 5 inches fingertips from floor. 4. LE flexibility increased from right SKTC 115 degrees to 130 degrees, SLR 50 degrees to 70 degrees with increased ease with flexibility /mobility. GOAL MET SKTC right 130 degrees, SLR 75-80 degrees Right. 5. Patient to demonstrate increased core engagement and strength through abdominal and trunk muscles with ability to have awareness of optimal lumbar neutral 15 reps with a series of exercises. GOAL MET Patient has been able to complete exercises in optimal neutral in supine, quadruped, seated on stability ball. 6. Patient to demonstrate increased strength right LE strength right ankle eversion, extensor hallucis and plantarflexors 4-/5 to 4/5. Ability to complete single heelraise RLE with ability to complete 5 resp with good ankle stability without turning into inversion/supination. GOAL NOT MET ankle plantar flexors 4 to 4-/5 noted improved stability at ankle with heelraies. Noted extensor hallucis 4/5. Long Term Goals:  Time Frame: 8 weeks  1. Patient has knowledge of proper body mechanics to decrease stress at low back. GOAL MET proper body mechanics noted.    2. Oswestry from 2% to 0%  GOAL MET Oswestry 0%    3. Patient independent in HEP with follow through with exercises for core strengthening, flexibility, RLE strengthening and body mechanics. Ongoing Need progression with further strengthening program for core and right ankle,        Patient Education: HEP as issued With foam roller exercises, lunges, wall squats     [x]  HEP/Education Completed: Continue HEP. Be aware of optimal lumbar neutral. Revised HEp with additional ex completed. HEP issued of seated stabilization with resistance band. [id number] Niesha Dumont  []  No new Education completed  []  Reviewed Prior HEP      [x]  Patient verbalized and/or demonstrated understanding of education provided. []  Patient unable to verbalize and/or demonstrate understanding of education provided. Will continue education. [x]  Barriers to learning: none    PLAN:  Treatment Recommendations: Strengthening, Range of Motion, Balance Training, Functional Mobility Training, Transfer Training, Neuromuscular Re-education, Manual Therapy - Soft Tissue Mobilization, Pain Management, Home Exercise Program, Patient Education and Self-Care Education and Training    []  Plan of care initiated. Plan to see patient 2-3 times per week for 8 weeks to address the treatment planned outlined above.   [x]  Continue with current plan of care  []  Modify plan of care as follows:    []  Hold pending physician visit  []  Discharge    Time In 1646   Time Out 1733   Timed Code Minutes: 47   Total Treatment Time: 47       Electronically Signed by: Richy Sharma PT

## 2021-07-26 ENCOUNTER — HOSPITAL ENCOUNTER (OUTPATIENT)
Dept: PHYSICAL THERAPY | Age: 56
Setting detail: THERAPIES SERIES
Discharge: HOME | End: 2021-07-26
Payer: COMMERCIAL

## 2021-07-26 PROCEDURE — 97110 THERAPEUTIC EXERCISES: CPT

## 2021-07-26 NOTE — DISCHARGE SUMMARY
[id number] [facility]  PHYSICAL THERAPY  [] EVALUATION  [] DAILY NOTE (LAND) [] DAILY NOTE (AQUATIC ) [] PROGRESS NOTE [x] DISCHARGE NOTE    [x] OUTPATIENT REHABILITATION CENTER [facility]   [] [place] [address]    [] [facility]   [] Yenni Solis    Date: 2021  Patient Name:  Rommel Moreno  : 1965  MRN: [id number]  CSN: [id number]    Referring Practitioner Wesley Moon DO   Diagnosis Radiculopathy, lumbar region [M54.16]  Other intervertebral disc displacement, lumbar region [M51.26]    Treatment Diagnosis Intermittent back and right buttock pain with RLE weakness/numbness. Date of Evaluation 21    Additional Pertinent History History of low back pain 2020      Functional Outcome Measure Used Oswestry    0%. Insurance: Primary: Payor: Erica Griffiths 150 /  /  / ,   Secondary:    Authorization Information: No precertification, Max of 30 visits per calendar year. PT alone. Aquatic therapy yes, modalities covered, iontophoresis not covered, telehealth covered. Visit # 8,  for progress note   Visits Allowed: 30   Recertification Date: 6022   Physician Follow-Up: Follow up as needed. Physician Orders: PT evaluate and treat. Exercise, ROM, strengthening LEs, HEP. Spine stabilization training, lumbar spine training, modalities as indicated. 3x per week for 6 weeks, swiss ball core, body mechanics. History of Present Illness: Back flare up 2020. Radiculopathy RLE with constant numbness right middle 3 toes and lateral right leg. Weakness left ankle, extensor hallucis, hip and core musculature. Previous PT at [facility] PT in [facility]. SUBJECTIVE: Patient reports that she has been riding her bike and noted that her back did not flare it up. Symptoms are much less and noting improved flexibility. Some small amount of numbness at middle 3 toes but better.  Right ankle strength is better but notes some weakness but better in strength compared to when she started PT. Notes overall less pain with average 0/10 to slight ache/pain in AM if she flexes at her low back. Understand her HEP to follow    TREATMENT   Precautions: History of lumbar disc derangement, lateral shift. Pain: 0/10. Back, right lateral leg is better, middle 3 toes difficult to notice and not as pronounced      X in shaded column indicates activity completed today   Modalities Parameters/  Location  Notes               Reassessment  x Refer to goal summary for status   Manual Therapy Time/Technique  Notes                     Exercise/Intervention   Notes   SKTC/DKTC  15 sec 3x  DKTC today   HS stretch and nerve glide RLE in 90/90 position with towel 15sec 3x     Piriformis figure 4 stretch RLE  15 sec  3x             Pelvic tilt, pelvic clocks CCW/CW on foam roller  10x, 10x      Bridges  With articulation on foam roller  10x      Bridges with articulation with rotation on each side. 3x each side                                  Abdominal bracing with marches with foam roller  15x      Abdominal bracing UE reciprocal while on foam roller  15x      Abdominal bracing hip adductor squeeze with ball between knees on foam roller  15x 5sec     Abdominal bracing hip abduction in hooklying resistance band on foam roller 15x Blue  band     Abdominal bracing with UE reciprocal arm arcs while legs in table top position  10x      Abdominal bracing with femur arcs on foam roller  10x      Pronelying: swan on foam roller  5x  x    pronelying following flexion and hooklying exercises  5 min      Prone press up  5x-10x   x    Quadruped: pelvic tilt x15      UE reciprocal in quadruped.   x15      LE reciprocal in quadruped x10      UE/LE in quadruped 10x      Prone press ups  10x      bookopening exercise right/left sidelying 5x each                           Standing: back extension -beginning of session, middle of session and end of session 2x5  x           Stability Ball seated: Pelvic tilts, pelvic clocks, side to side  10x  x    Ankle pf/df 10x  x    Marches  10x  x    LAQS  5x  x    UE: shoulder flexion, shoulder abduction  x10  x    UE:resistance band: rows, ER bilaterally, shoulder extension x10 peach x    UE chest expansion shoulder extension with neck rotation x4 peach x                  Standing: back against wall  Partial knee bends  x15 5 count     Lunges with RLE, then LLE  x10       Bilateral heelraises, single heelraises. X15, x10      3 way hip with LEs r/l x10             hydrostick  Hip width stance fwd/back , diagonals right/left  10x       gray pose and diagonal/oblique stretch to right/left  3x      Elliptical.  3 minutes         Specific Interventions Next Treatment: SKTC, Diagonal KTC, right piriformis stretch, hamstring stretch,  pelvic tilt, dead bugs/marches, UE reciprocal/LE reciprocal, bridges. Core engagement/strengthening. Progress to seated/standing dynamic stabilization. Activity/Treatment Tolerance:  [x]  Patient tolerated treatment well  []  Patient limited by fatigue  []  Patient limited by pain   []  Patient limited by medical complications  []  Other:     Assessment: Reassessment today. Patient has progressed well in PT with meeting goals and independent with HEP. Oswestry 0%. ROM WNLS flexion and extension lumbar spine. Strength at right ankle plantarflexors and extensor hallucis also improved 4/5. Ex progressed on stability ball, foam roller and core work without back or LE symptoms. Independent with HEP. Body Structures/Functions/Activity Limitations: impaired activity tolerance, impaired balance, impaired ROM, impaired safety awareness, impaired sensation, impaired strength and pain  Prognosis: good    GOALS:  Patient Otilio Layton she can do to take away numbness in her right leg. Learn activities and things to protect her back from further injury. Stay active.  Patient enjoys yardwork, walk daily 10, 000 steps per day. :   GOAL BEING MET active with yardwork, walking and now biking. Short Term Goals:  Time Frame: 4 weeks  1. Patient to report of pain levels 0-1/10 at right buttock,  with PT and activities of daily living, work. GOAL MET Patient having no buttock pain or back pain. 0/10 occasional stiffness at back but is able to complete activities at home with more ease and able to place shoes and socks on with less stiffness/pain. 2. Patient to demonstrate increased sitting tolerance > 1hour to unlimited sitting tolerance, standing and walking tolerance for 1 hour. GOAL MET Patient is able to tolerate as much walking now without limitation. Patient is able to it > 1 hour. 3. Patient to demonstrate increased lumbar articulation in flexion and LE flexibility with forward flexion fingertips from floor 8 inches to 2 inches. GOAL NOT MET forward flexion 5 inches fingertips from floor. 4. LE flexibility increased from right SKTC 115 degrees to 130 degrees, SLR 50 degrees to 70 degrees with increased ease with flexibility /mobility. GOAL MET SKTC right 130 degrees, SLR 75-80 degrees Right. 5. Patient to demonstrate increased core engagement and strength through abdominal and trunk muscles with ability to have awareness of optimal lumbar neutral 15 reps with a series of exercises. GOAL MET Patient has been able to complete exercises in optimal neutral in supine, quadruped, seated on stability ball. 6. Patient to demonstrate increased strength right LE strength right ankle eversion, extensor hallucis and plantarflexors 4-/5 to 4/5. Ability to complete single heelraise RLE with ability to complete 5 resp with good ankle stability without turning into inversion/supination. GOAL NOT MET ankle plantar flexors 4/5 noted improved stability at ankle with heelraies. Noted extensor hallucis 5/5  Patient is able to complete 10 single heelraises with slight fatigue of muscles #6.  But not ankle not supinating or rolling in with heelraise now.           Long Term Goals:  Time Frame: 8 weeks  1. Patient has knowledge of proper body mechanics to decrease stress at low back. GOAL MET proper body mechanics noted. 2. Oswestry from 2% to 0%  GOAL MET Oswestry 0%    3. Patient independent in HEP with follow through with exercises for core strengthening, flexibility, RLE strengthening and body mechanics. GOAL MET Independent in HEP. Patient Education: HEP as issued With foam roller exercises, lunges, wall squats     [x]  HEP/Education Completed: Continue HEP. Be aware of optimal lumbar neutral. Revised HEp with additional ex completed. HEP issued of seated stabilization with resistance band. [id number] Niesha Dumont  []  No new Education completed  []  Reviewed Prior HEP      [x]  Patient verbalized and/or demonstrated understanding of education provided. []  Patient unable to verbalize and/or demonstrate understanding of education provided. Will continue education. [x]  Barriers to learning: none    PLAN:  Treatment Recommendations: Strengthening, Range of Motion, Balance Training, Functional Mobility Training, Transfer Training, Neuromuscular Re-education, Manual Therapy - Soft Tissue Mobilization, Pain Management, Home Exercise Program, Patient Education and Self-Care Education and Training    []  Plan of care initiated. Plan to see patient 2-3 times per week for 8 weeks to address the treatment planned outlined above.   []  Continue with current plan of care  []  Modify plan of care as follows:    []  Hold pending physician visit  [x]  Discharge    Time In 1647   Time Out 1731   Timed Code Minutes: 44   Total Treatment Time: 44       Electronically Signed by: Jeancarlos Schulz, PT